# Patient Record
Sex: FEMALE | Race: BLACK OR AFRICAN AMERICAN | Employment: UNEMPLOYED | URBAN - METROPOLITAN AREA
[De-identification: names, ages, dates, MRNs, and addresses within clinical notes are randomized per-mention and may not be internally consistent; named-entity substitution may affect disease eponyms.]

---

## 2018-06-04 ENCOUNTER — HOSPITAL ENCOUNTER (OUTPATIENT)
Dept: PHYSICAL THERAPY | Age: 58
Discharge: HOME | End: 2018-06-04
Payer: COMMERCIAL

## 2018-06-04 PROCEDURE — 97110 THERAPEUTIC EXERCISES: CPT | Performed by: PHYSICAL THERAPIST

## 2018-06-04 PROCEDURE — 97161 PT EVAL LOW COMPLEX 20 MIN: CPT | Performed by: PHYSICAL THERAPIST

## 2018-06-04 NOTE — PROGRESS NOTES
[facility]  [address], [address] [facility]    OUTPATIENT physical Therapy      NAME: Kavitha Medina AGE: 62 y.o. GENDER: female  DATE: 6/4/2018  REFERRING PHYSICIAN: bruna Redding*    OBJECTIVE DATA SUMMARY:   Medical Diagnosis: Low back pain (M54.5)  PT Diagnosis: Other reduced mobility secondary to low back pain  Date of Onset: 2 months  Mechanism of Injury/Chief Complaint: Patient does not report injury but does repeated lifting, pushing, pulling at work  Present Symptoms: Patient presents with aching, throbbing bilateral low back pain. Patient reports that she has a herniated disc and has experienced low back pain off and on since 2000. Patient reports that she can go for a while without any low back pain. Patient reports that the pain is exacerbated by activity. She has been experiencing this current low back pain for 2 months now. Patient does not report radicular symptoms but has in the past. Patient wears a back brace at work. Functional Deficits and Limitations:   [x]     Sitting:   []    Dressing:   []    Reaching:  [x]     Standing:   []     Bathing:   [x]    Lifting:  [x]     Walking:   []     Cooking:   []    Yardwork:  []     Sleeping:   []     Cleaning:   []     Driving:  [x]     Work Tasks:  []     Recreation:  []    Other:    HISTORY:  Past Medical History: No past medical history on file. No past surgical history on file. Precautions: None  Current Medications: Lidocaine; Tylenol; Voltaren PRN   Prior Level of Function/Home Situation: Independent  Personal factors and/or comorbidities impacting plan of care: On and off low back pain since 2000 secondary to herniated disc  Social/Work History:  [employer] - 10 hour shifts; involves twisting, bending, pulling, lifting, pushing  Previous Therapy: Yes at [facility] for vertigo in 2015     SUBJECTIVE:   \"I've had back pain on and off since 2000. \"    Patients goals for therapy: to get my core stronger and have less pain    OBJECTIVE DATA SUMMARY:   EXAMINATION/PRESENTATION/DECISION MAKING:   Pain:   Location: Bilateral low back pain  Quality: aching and throbbing  Now: 4/10  Best: 0/10  Worst: 10/10  Factors that improve pain: heat    Spinal Assessment:   Lumbar Spine (AROM)  (*Measured 3rd finger from the floor)  Flexion  To floor; no pain  Extension WNL; discomfort; no change with repeated extension x 10  R side bend 15\"; discomfort  L side bend 15\"; discomfort  R rotation WNL  L rotation WNL    Joint Mobility:   Hypomobility noted, lumbar spine     Palpation:   Tender to palpation at left lumbar paraspinals and piriformis    Neurologic Assessment:   Tone: Normal   Sensation: Intact    Reflexes: NT    Special Tests:   (-) Slump and SLR    Mobility:   Transitional Movements: WNL    Gait: WNL    Balance: WNL    Functional Measure:   Trenton Sear: 9/24    TREATMENT/INTERVENTION:  Modalities (Rationale): to decrease pain and muscle guarding  MHP to low back for 10 minutes in supine at end of session; no skin irritation noted    Therapeutic Exercises:  Initial HEP provided 6/4/18: Rashmi Hamlin, SLR, PPT, SKTC, Angry cat stretch, Child's pose, [place] dead lifts; alternate UE/LE    PPT: 10 reps  SLR: 10 reps B  Bridges: 10 reps  SKTC: 5 reps B    Child's pose: 3 reps with 30 second holds  Angry cat stretch: 10 reps  Quadruped alt UE/LE: 10 reps B    [place] dead lifts with 3#: 5 reps B    Patient will benefit from core and glute strengthening as well as spinal stabilization    Manual Therapy:  None this date    Activity tolerance and post treatment pain report:   Good; 3/10    Based on the above components, the patient evaluation is determined to be of the following complexity level: LOW     Education:  [x]     Home exercise program provided. Education was provided to the patient on the following topics: Patient provided with initial HEP and educated on importance of regular performance of exercises.   Patient verbalized understanding of the topics presented. ASSESSMENT:   Sri Young is a 62 y.o. female who presents with bilateral low back pain for 2 months. Patient reports a history of low back pain since 2000 with periods of exacerbation. Patient reports that her pain is exacerbated by activity. Patient is a  at [employer] and her job requires her to stand for her 10 hour shifts and bend, lift, twist, pull, etc. Patient is aware of proper body mechanics and wears a back brace at work. Patient will benefit from core and glute strengthening and spinal stabilization. Physical therapy problems based on objective data include: pain affecting function, decrease ADL/ functional abilitiies, decrease activity tolerance, decrease flexibility/ joint mobility and decrease transfer abilities . Patient provided with initial HEP and educated on importance of regular performance of exercises. Patient will benefit from skilled intervention to address these impairments. Rehabilitation potential is considered to be Good. Factors which may influence rehabilitation potential include good motivation. Patient will benefit from physical therapy visits over 6 weeks to optimize improvement in these areas. PLAN OF CARE:   Recommendations and Planned Interventions:  []     Therapeutic Activities  [x]     Heat/Ice  [x]     Therapeutic Exercises  []     Ultrasound  []     Gait training  [x]     E-stim  []     Balance training  [x]     Home exercise program  [x]     Manual Therapy  [x]     TENS  [x]     Neuro Re-Ed  []     Edema management  [x]     Posture/Biomechanics  []     Pain management  [x]     Traction  []     Other:    Frequency/Duration:  Patient will be followed by physical therapy 2 times a week for 6 weeks to address goals. GOALS  Short term goals  Time frame: 3 weeks  1. Patient will be compliant and independent with the initial HEP as evidenced by being able to perform without cueing.   2. Patient will report a 25% improvement in symptoms. 3. Patient will have an increased tolerance for standing to allow 2 hours of the activity before symptoms start. 4. Patient will tolerate 20 minutes of clinic activities before onset of symptoms. Long term goals  Time frame: 6 weeks  1. Patient will report pain level decrease to 2/10 to allow increased ease of movement. 2. Patient will have an improved score on the [facility] Shauna outcome measure by 5 points to demonstrate an increase in functional activity tolerance. 3. Patient will be independent in final individualized HEP. 4. Patient will return to working without being limited by symptoms. 5. Patient will sleep 6-8 hours without being interrupted by pain. [x]     Patient has participated in goal setting and agrees to work toward plan of care. [x]     Patient was instructed to call if questions or concerns arise. Thank you for this referral.  Jose Anne, PT   Time Calculation: 50 mins    Patient Time in clinic:   Start Time: 1500   Stop Time: 1550    TREATMENT PLAN EFFECTIVE DATES:   6/4/2018 TO 8/6/18  I have read the above plan of care for Valorie Darling and certify the need for skilled physical therapy services.     Physician Signature: ____________________________________________________    Date: _________________________________________________________________

## 2018-06-11 ENCOUNTER — HOSPITAL ENCOUNTER (OUTPATIENT)
Dept: PHYSICAL THERAPY | Age: 58
Discharge: HOME | End: 2018-06-11
Payer: COMMERCIAL

## 2018-06-11 NOTE — PROGRESS NOTES
[facility]  [address], [address] [facility]    OUTPATIENT physical Therapy      6/11/2018:  Mariela Rico was not seen on this date for physical therapy for the following reasons:    [x]     Patient called to cancel the visit for the following reasons: schedule conflict   []     Patient missed the visit and did not call to cancel.     Sudha Jackson, PT

## 2018-06-14 ENCOUNTER — HOSPITAL ENCOUNTER (OUTPATIENT)
Dept: PHYSICAL THERAPY | Age: 58
Discharge: HOME | End: 2018-06-14
Payer: COMMERCIAL

## 2018-06-14 PROCEDURE — 97110 THERAPEUTIC EXERCISES: CPT | Performed by: PHYSICAL THERAPIST

## 2018-06-14 NOTE — PROGRESS NOTES
[facility]  [address], [address] [facility]    OUTPATIENT physical Therapy DAILY Treatment NOTe  Visit: 2    NAME: Nain Archuleta AGE: 62 y.o. GENDER: female  DATE: 6/14/2018  REFERRING PHYSICIAN: bruna Harrington*      GOALS  Short term goals  Time frame: 3 weeks  1. Patient will be compliant and independent with the initial HEP as evidenced by being able to perform without cueing. 2. Patient will report a 25% improvement in symptoms. 3. Patient will have an increased tolerance for standing to allow 2 hours of the activity before symptoms start. 4. Patient will tolerate 20 minutes of clinic activities before onset of symptoms.      Long term goals  Time frame: 6 weeks  1. Patient will report pain level decrease to 2/10 to allow increased ease of movement. 2. Patient will have an improved score on the [facility] Shauna outcome measure by 5 points to demonstrate an increase in functional activity tolerance. 3. Patient will be independent in final individualized HEP. 4. Patient will return to working without being limited by symptoms. 5. Patient will sleep 6-8 hours without being interrupted by pain. SUBJECTIVE:   \"I've been doing good. The exercises have really helped and I don't have any pain. \"    Pain In: 0/10    OBJECTIVE DATA SUMMARY:   Objective data from initial evaluation:  EXAMINATION/PRESENTATION/DECISION MAKING:   Pain:   Location: Bilateral low back pain  Quality: aching and throbbing  Now: 4/10  Best: 0/10  Worst: 10/10  Factors that improve pain: heat     Spinal Assessment:   Lumbar Spine (AROM)  (*Measured 3rd finger from the floor)  Flexion                         To floor; no pain  Extension                     WNL; discomfort; no change with repeated extension x 10  R side bend                 15\"; discomfort  L side bend                  15\"; discomfort  R rotation                     WNL  L rotation                     WNL     Joint Mobility: Hypomobility noted, lumbar spine      Palpation:   Tender to palpation at left lumbar paraspinals and piriformis     Neurologic Assessment:                         Tone: Normal                         Sensation: Intact                          Reflexes: NT     Special Tests:   (-) Slump and SLR     Mobility:                         Transitional Movements: WNL                          Gait: WNL     Balance: WNL     Functional Measure:   Isamar Blackburn: 9/24     TREATMENT/INTERVENTION:  Modalities (Rationale): to decrease pain and muscle guarding  MHP to low back for 10 minutes in supine at end of session; no skin irritation noted -held this date     Therapeutic Exercises:  Initial HEP provided 6/4/18: David Nice, SLR, PPT, SKTC, Angry cat stretch, Child's pose, [place] dead lifts; alternate UE/LE     PPT: 10 reps  SLR: 10 reps B  Bridges: 10 reps  One legged bridges: 5 reps B  SKTC: 5 reps B  Sidelying hip abduction: 10 reps B     Child's pose: 3 reps with 30 second holds  Angry cat stretch: 10 reps  Quadruped alt UE/LE: 10 reps B     [place] dead lifts with 3#: 2 sets of 5 reps B  Good mornings with bar: 3 sets of 5 reps  Kettle bell swings 5#: 2 sets of 10 reps    Side steps with blue TB: 10 sides x 2 B  Squats with blue TB: 10 reps     Manual Therapy:  None this date    Activity tolerance and post treatment pain report:   Good  Pain Out: 0/10    Education:  Education was provided to the patient on the following topics. []    No changes were made to the home exercise program.  [x]    The following changes were made to the home exercise program: Added one legged bridges, kettle bell swings, good mornings, side steps with TB; squats with TB (blue TB dispensed)   Patient verbalized understanding of the topics presented. ASSESSMENT:   Patient returns following initial evaluation. Patient with regular performance of exercises and demonstrates good form. Patient tolerated additional exercises well.  Patient presents with no pain in low back for the last week. Patient has not had any pain with activity at work. Patient to come for one more visit to assess form with new exercises and to ensure pain level has not increased with any activity. Patients progression toward goals is as follows:  [x]     Improving appropriately and progressing toward goals  []     Improving slowly and progressing toward goals  []     Not making progress toward goals and plan of care will be adjusted    PLAN OF CARE:   Patient continues to benefit from skilled intervention to address the above impairments. [x]    Continue treatment per established plan of care.   []     Recommend the following changes to the plan of care    Recommendations/Intent for next treatment: discharge next session    Annalise Spence, PT   Time Calculation: 30 mins  Patient Time in clinic:   Start Time: 1430   Stop Time: 1500

## 2018-06-21 ENCOUNTER — HOSPITAL ENCOUNTER (OUTPATIENT)
Dept: PHYSICAL THERAPY | Age: 58
Discharge: HOME | End: 2018-06-21
Payer: COMMERCIAL

## 2018-06-21 PROCEDURE — 97110 THERAPEUTIC EXERCISES: CPT | Performed by: PHYSICAL THERAPIST

## 2018-06-21 NOTE — PROGRESS NOTES
[facility]  [address], [address] [facility]    OUTPATIENT physical Therapy DAILY Treatment NOTe WITH DISCHARGE  Visit: 3    NAME: Arielle Green AGE: 62 y.o. GENDER: female  DATE: 6/21/2018  REFERRING PHYSICIAN: bruna Mandel*      GOALS  Short term goals  Time frame: 3 weeks  1. Patient will be compliant and independent with the initial HEP as evidenced by being able to perform without cueing. MET  2. Patient will report a 25% improvement in symptoms. MET  3. Patient will have an increased tolerance for standing to allow 2 hours of the activity before symptoms start. MET  4. Patient will tolerate 20 minutes of clinic activities before onset of symptoms. MET     Long term goals  Time frame: 6 weeks  1. Patient will report pain level decrease to 2/10 to allow increased ease of movement. MET  2. Patient will have an improved score on the [facility] Shauna outcome measure by 5 points to demonstrate an increase in functional activity tolerance. MET  3. Patient will be independent in final individualized HEP. MET  4. Patient will return to working without being limited by symptoms. MET   5. Patient will sleep 6-8 hours without being interrupted by pain. MET       SUBJECTIVE:   \"I've been feeling pretty good. \"    Pain In: 0/10    OBJECTIVE DATA SUMMARY:     EXAMINATION/PRESENTATION/DECISION MAKING:   Pain:   Location: Bilateral low back pain  Quality: aching and throbbing  Now: 0/10  Best: 0/10  Worst: 3/10  Factors that improve pain: heat; exercises     Spinal Assessment:   Lumbar Spine (AROM)  (*Measured 3rd finger from the floor)  Flexion                         To floor; no pain  Extension                     WNL; discomfort  R side bend                 15\"  L side bend                  15\"   R rotation                     WNL  L rotation                     WNL     Joint Mobility:   Hypomobility noted, lumbar spine      Palpation:   No tenderness this date     Neurologic Assessment:                         Tone: Normal                         Sensation: Intact                          Reflexes: NT     Special Tests:   (-) Slump and SLR     Mobility:                         Transitional Movements: WNL                          Gait: WNL     Balance: WNL     Functional Measure:   Berton Shaper: 9/24 on evaluation  Berton Shaper: 4/24 on discharge     TREATMENT/INTERVENTION:  Modalities (Rationale): None this date     Therapeutic Exercises:  Initial HEP provided 6/4/18: Teresa Ruano, SLR, PPT, SKTC, Angry cat stretch, Child's pose, [place] dead lifts; alternate UE/LE  Added to HEP: Side steps with TB; squats with TB; sidelying hip abduction; quadruped hip extension; TA holds in standing     PPT: 10 reps  SLR: 10 reps B  Bridges: 10 reps  One legged bridges: 5 reps B  SKTC: 5 reps B  Sidelying hip abduction: 10 reps B     Child's pose: 3 reps with 30 second holds  Angry cat stretch: 10 reps  Quadruped alt UE/LE: 10 reps B   Quadruped hip extension: 10 reps B     [place] dead lifts with 3#: 2 sets of 5 reps B  Kettle bell swings 3#: 2 sets of 10 reps    Side steps with blue TB: 10 sides   Squats with blue TB: 10 reps  Standing TA holds: 5 second hold with 5# weight to simulate work activities     Activity tolerance and post treatment pain report:   Good  Pain Out: 0/10    Education:  Education was provided to the patient on the following topics. []    No changes were made to the home exercise program.  [x]    The following changes were made to the home exercise program: Added to HEP ( see above); educated to continue exercises 3 times per week; educated on importance of maintaining TA hold during lifting at work and at home in order to decrease load placed to low back and minimize risk of injury  Patient verbalized understanding of the topics presented. ASSESSMENT:   Patient discharged from physical therapy after 3 visits from 6/4/18 to 6/21/18.  Patient reports no back pain with regular activities. Patient with minor discomfort at work one day earlier this week. Educated extensively on proper body mechanics and maintaining TA hold with lifting at work; patient demonstrated good understanding of topics. Patient with regular performance of exercises and demonstrates good form. Patient provided with comprehensive HEP to continue 3 times per week. Patient with significant improvement on Frandy Joaquin questionnaire from 9/24 on evaluation to 4/24 on discharge. PLAN OF CARE:   Patient will be discharged from physical therapy at this time.   Criteria for termination of care:  [x]   Goals Achieved  []   [address]  []   Patient has not returned  []   Other  Plan for follow up, continuing care, or equipment recommendations: Patient educated to continue stretches and strengthening exercises at least 3 times per week   Thank you for this referral.    Omar Wyatt, PT   Time Calculation: 20 mins  Patient Time in clinic:   Start Time: 1300   Stop Time: [phone]

## 2019-09-27 ENCOUNTER — HOSPITAL ENCOUNTER (OUTPATIENT)
Dept: GENERAL RADIOLOGY | Age: 59
Discharge: HOME | End: 2019-09-27
Payer: COMMERCIAL

## 2019-09-27 DIAGNOSIS — M54.2 NECK PAIN: ICD-10-CM

## 2019-09-27 PROCEDURE — 72050 X-RAY EXAM NECK SPINE 4/5VWS: CPT

## 2020-07-30 NOTE — PROGRESS NOTES
Number on file is not a working number, called Dr. Marquez Banker office and was given: [phone] (mobile) and [phone] (other).

## 2020-07-31 ENCOUNTER — HOSPITAL ENCOUNTER (OUTPATIENT)
Dept: PREADMISSION TESTING | Age: 60
Discharge: HOME | End: 2020-07-31
Payer: COMMERCIAL

## 2020-07-31 PROCEDURE — 87635 SARS-COV-2 COVID-19 AMP PRB: CPT

## 2020-08-01 LAB
SARS-COV-2, COV2NT: NOT DETECTED
SOURCE, COVRS: NORMAL
SPECIMEN SOURCE, FCOV2M: NORMAL

## 2020-08-04 ENCOUNTER — HOSPITAL ENCOUNTER (OUTPATIENT)
Age: 60
Discharge: HOME | End: 2020-08-04
Attending: INTERNAL MEDICINE | Admitting: INTERNAL MEDICINE
Payer: COMMERCIAL

## 2020-08-04 VITALS
RESPIRATION RATE: 20 BRPM | BODY MASS INDEX: 26.99 KG/M2 | SYSTOLIC BLOOD PRESSURE: 126 MMHG | TEMPERATURE: 97.8 F | HEART RATE: 60 BPM | OXYGEN SATURATION: 98 % | WEIGHT: 162 LBS | DIASTOLIC BLOOD PRESSURE: 75 MMHG | HEIGHT: 65 IN

## 2020-08-04 DIAGNOSIS — R07.9 CHEST PAIN, UNSPECIFIED TYPE: ICD-10-CM

## 2020-08-04 PROCEDURE — 77030004549 HC CATH ANGI DX PRF MRTM -A: Performed by: INTERNAL MEDICINE

## 2020-08-04 PROCEDURE — 77030008543 HC TBNG MON PRSS MRTM -A: Performed by: INTERNAL MEDICINE

## 2020-08-04 PROCEDURE — 74011636320 HC RX REV CODE- 636/320: Performed by: INTERNAL MEDICINE

## 2020-08-04 PROCEDURE — 93458 L HRT ARTERY/VENTRICLE ANGIO: CPT | Performed by: INTERNAL MEDICINE

## 2020-08-04 PROCEDURE — 77030019569 HC BND COMPR RAD TERU -B: Performed by: INTERNAL MEDICINE

## 2020-08-04 PROCEDURE — 99152 MOD SED SAME PHYS/QHP 5/>YRS: CPT | Performed by: INTERNAL MEDICINE

## 2020-08-04 PROCEDURE — C1894 INTRO/SHEATH, NON-LASER: HCPCS | Performed by: INTERNAL MEDICINE

## 2020-08-04 PROCEDURE — 74011250636 HC RX REV CODE- 250/636: Performed by: INTERNAL MEDICINE

## 2020-08-04 PROCEDURE — 77030010221 HC SPLNT WR POS TELE -B: Performed by: INTERNAL MEDICINE

## 2020-08-04 PROCEDURE — 77030030195 HC CATH ANGI DX PRF4 MRTM -A: Performed by: INTERNAL MEDICINE

## 2020-08-04 PROCEDURE — 74011000250 HC RX REV CODE- 250: Performed by: INTERNAL MEDICINE

## 2020-08-04 RX ORDER — GUAIFENESIN 100 MG/5ML
81 LIQUID (ML) ORAL DAILY
COMMUNITY

## 2020-08-04 RX ORDER — LIDOCAINE HYDROCHLORIDE 10 MG/ML
INJECTION, SOLUTION EPIDURAL; INFILTRATION; INTRACAUDAL; PERINEURAL AS NEEDED
Status: DISCONTINUED | OUTPATIENT
Start: 2020-08-04 | End: 2020-08-04 | Stop reason: HOSPADM

## 2020-08-04 RX ORDER — SODIUM CHLORIDE 0.9 % (FLUSH) 0.9 %
5-40 SYRINGE (ML) INJECTION EVERY 8 HOURS
Status: DISCONTINUED | OUTPATIENT
Start: 2020-08-04 | End: 2020-08-04 | Stop reason: HOSPADM

## 2020-08-04 RX ORDER — BUPROPION HYDROCHLORIDE 150 MG/1
150 TABLET, EXTENDED RELEASE ORAL
COMMUNITY

## 2020-08-04 RX ORDER — HEPARIN SODIUM 200 [USP'U]/100ML
INJECTION, SOLUTION INTRAVENOUS
Status: COMPLETED | OUTPATIENT
Start: 2020-08-04 | End: 2020-08-04

## 2020-08-04 RX ORDER — VERAPAMIL HYDROCHLORIDE 2.5 MG/ML
INJECTION, SOLUTION INTRAVENOUS AS NEEDED
Status: DISCONTINUED | OUTPATIENT
Start: 2020-08-04 | End: 2020-08-04 | Stop reason: HOSPADM

## 2020-08-04 RX ORDER — LAMOTRIGINE 200 MG/1
200 TABLET ORAL DAILY
COMMUNITY

## 2020-08-04 RX ORDER — LOSARTAN POTASSIUM AND HYDROCHLOROTHIAZIDE 25; 100 MG/1; MG/1
0.5 TABLET ORAL DAILY
COMMUNITY

## 2020-08-04 RX ORDER — FENTANYL CITRATE 50 UG/ML
INJECTION, SOLUTION INTRAMUSCULAR; INTRAVENOUS AS NEEDED
Status: DISCONTINUED | OUTPATIENT
Start: 2020-08-04 | End: 2020-08-04 | Stop reason: HOSPADM

## 2020-08-04 RX ORDER — MIDAZOLAM HYDROCHLORIDE 1 MG/ML
INJECTION, SOLUTION INTRAMUSCULAR; INTRAVENOUS AS NEEDED
Status: DISCONTINUED | OUTPATIENT
Start: 2020-08-04 | End: 2020-08-04 | Stop reason: HOSPADM

## 2020-08-04 RX ORDER — PROCHLORPERAZINE MALEATE 5 MG
5 TABLET ORAL
COMMUNITY

## 2020-08-04 RX ORDER — NITROGLYCERIN 0.4 MG/1
0.4 TABLET SUBLINGUAL
COMMUNITY

## 2020-08-04 RX ORDER — OXYBUTYNIN CHLORIDE 10 MG/1
10 TABLET, EXTENDED RELEASE ORAL DAILY
COMMUNITY

## 2020-08-04 RX ORDER — SODIUM CHLORIDE 0.9 % (FLUSH) 0.9 %
5-40 SYRINGE (ML) INJECTION AS NEEDED
Status: DISCONTINUED | OUTPATIENT
Start: 2020-08-04 | End: 2020-08-04 | Stop reason: HOSPADM

## 2020-08-04 RX ORDER — HEPARIN SODIUM 1000 [USP'U]/ML
INJECTION, SOLUTION INTRAVENOUS; SUBCUTANEOUS AS NEEDED
Status: DISCONTINUED | OUTPATIENT
Start: 2020-08-04 | End: 2020-08-04 | Stop reason: HOSPADM

## 2020-08-04 RX ORDER — AMLODIPINE BESYLATE 5 MG/1
5 TABLET ORAL DAILY
COMMUNITY

## 2020-08-04 RX ORDER — QUETIAPINE FUMARATE 100 MG/1
100 TABLET, FILM COATED ORAL DAILY
COMMUNITY

## 2020-08-04 RX ORDER — METOPROLOL SUCCINATE 25 MG/1
25 TABLET, EXTENDED RELEASE ORAL DAILY
COMMUNITY

## 2020-08-04 RX ADMIN — Medication 10 ML: at 17:45

## 2020-08-04 NOTE — DISCHARGE INSTRUCTIONS
[address] [facility], [address]   [phone]  [address]    [email or web address]    Patient Discharge Instructions    Tong Ruff / [id number] : 1960    Admitted 2020 Discharged: 2020       · It is important that you take the medication exactly as they are prescribed. · Keep your medication in the bottles provided by the pharmacist and keep a list of the medication names, dosages, and times to be taken in your wallet. · Do not take other medications without consulting your doctor. BRING ALL OF YOUR MEDICINES TO YOUR OFFICE VISIT with my nurse practitioner, Alvaro Lobo. .    Follow-up with my nurse practitioner, Alvaro Lobo in 2 weeks. Cardiac Catheterization  Discharge Instructions    Transradial Catheterization Discharge Instructions (WRIST)    Discharge instructions: Your radial artery in your wrist was used for your cardiac catheterization. This site may be slightly bruised and sore following your procedure. Expect mild tingling or the hand and tenderness at the puncture site for up to 3 days. Excess movement of the wrist used should be avoided for the next 24-48 hours. 1. No lifting over 2 pounds (approximately a ½ gallon of milk) with this arm for 24 hours. 2. Keep the site of the procedure covered with a bandage for 24 hours. 3. You may shower the day after your procedure. Do not take a tub bath or submerge the puncture site in water for 48 hours. 4. No heavy impact activity/lifting > 30 pounds for 1 week. If bleeding of the wrist occurs at home:   If the site on your wrist where you had the catheterization procedure begins to bleed, do not panic. 1. Place 1 or 2 fingers over the puncture site and hold pressure to stop the bleeding. You may be able to feel your pulse as you hold pressure. 2. Lift your fingers after 5 minutes to see if the bleeding has stopped.    3. Once the bleeding has stopped, gently wipe the wrist area clean and cover with a bandage. If the bleeding from your wrist does not stop after 15 minutes, or if there is a large amount of bleeding or spurting, call 911 immediately (do not drive yourself to the hospital). Other concerns: The site may be slightly bruised and sore following your procedure. Should any of the following occur, contact your physician immediately:   1. Any cool or coldness of the arm, discoloration over a large area, ongoing numbness or any abnormal sensations , moderate to severe pain or swelling in the arm. 2. Redness, soreness, swelling, chills or fever, or colored drainage at the procedure site within 3-7 days after your procedure. If you have any further questions or concerns regarding your procedure please call the Cardiac Cath Lab office at [phone]. During regular business hours ask to speak to Dr. Moe Burks. During non-business hours the answering service will answer. Ask to speak to the physician on call for [facility].     Transfemoral Catheterization Discharge Instructions Ismael Johnston)     Do not drive, operate any machinery, or sign any legal documents for 24 hours after your procedure. You must have someone to drive you home.  You may take a shower 24 hours after your cardiac catheterization. Be sure to get the dressing wet and then remove it; gently wash the area with warm soapy water. Pat dry and leave open to air. To help prevent infections, be sure to keep the cath site clean and dry. No lotions, creams, powders, ointments, etc. in the cath site for approximately 1 week.  Do not take a tub bath, get in a hot tub or swimming pool for approximately 5 days or until the cath site is completely healed.  No strenuous activity or heavy lifting over 10 lbs. for 7 days.  Drink plenty of fluids for 24-48 hours after your cath to flush the contrast dye from your kidneys. No alcoholic beverages for 24 hours.   You may resume your previous diet (low fat, low cholesterol) after your cath.  After your cath, some bruising or discomfort is common during the healing process. Tylenol, 1-2 tablets every 6 hours as needed, is recommended if you experience any discomfort. If you experience any signs or symptoms of infection such as fever, chills, or poorly healing incision, persistent tenderness or swelling in the groin, redness and/or warmth to the touch, numbness, significant tingling or pain at the groin site or affected extremity, rash, drainage from the cath site, or if the leg feels tight or swollen, call your physician right away.  If bleeding at the cath site occurs, take a clean gauze pad and apply direct pressure to the groin just above the puncture site. Call 911 immediately, and continue to apply direct pressure until an ambulance gets to your location.  You may return to work  2  days after your cardiac cath if no groin bleeding. Information obtained by :  I understand that if any problems occur once I am at home I am to contact my physician. I understand and acknowledge receipt of the instructions indicated above. R.N.'s Signature                                                                  Date/Time                                                                                                                                              Patient or Representative Signature                                                          Date/Time      Hilda Torres III, DO             [address]    [phone]  [place], [address] [place]    [email or web address]

## 2020-08-04 NOTE — PROGRESS NOTES
Problem: Falls - Risk of  Goal: *Absence of Falls  Description: Document Tre Hathaway Fall Risk and appropriate interventions in the flowsheet.   Outcome: Resolved/Met     Problem: Patient Education: Go to Patient Education Activity  Goal: Patient/Family Education  Outcome: Resolved/Met     Problem: Patient Education: Go to Patient Education Activity  Goal: Patient/Family Education  Outcome: Resolved/Met     Problem: Cath Lab Procedures: Pre-Procedure  Goal: Off Pathway (Use only if patient is Off Pathway)  Outcome: Resolved/Met  Goal: Activity/Safety  Outcome: Resolved/Met  Goal: Consults, if ordered  Outcome: Resolved/Met  Goal: Diagnostic Test/Procedures  Outcome: Resolved/Met  Goal: Nutrition/Diet  Outcome: Resolved/Met  Goal: Discharge Planning  Outcome: Resolved/Met  Goal: Medications  Outcome: Resolved/Met  Goal: Respiratory  Outcome: Resolved/Met  Goal: Treatments/Interventions/Procedures  Outcome: Resolved/Met  Goal: Psychosocial  Outcome: Resolved/Met  Goal: *Verbalize description of procedure  Outcome: Resolved/Met  Goal: *Consent signed  Outcome: Resolved/Met     Problem: Cath Lab Procedures: Post-Cath Day of Procedure (Initiate SCIP Measures for Post-Op Care)  Goal: Off Pathway (Use only if patient is Off Pathway)  Outcome: Resolved/Met  Goal: Activity/Safety  Outcome: Resolved/Met  Goal: Consults, if ordered  Outcome: Resolved/Met  Goal: Diagnostic Test/Procedures  Outcome: Resolved/Met  Goal: Nutrition/Diet  Outcome: Resolved/Met  Goal: Discharge Planning  Outcome: Resolved/Met  Goal: Medications  Outcome: Resolved/Met  Goal: Respiratory  Outcome: Resolved/Met  Goal: Treatments/Interventions/Procedures  Outcome: Resolved/Met  Goal: Psychosocial  Outcome: Resolved/Met  Goal: *Procedure site is without bleeding and signs of infection six hours post sheath removal  Outcome: Resolved/Met  Goal: *Hemodynamically stable  Outcome: Resolved/Met  Goal: *Optimal pain control at patient's stated goal  Outcome: Resolved/Met     Problem: Cath Lab Procedures: Post-Cath Day 1  Goal: Off Pathway (Use only if patient is Off Pathway)  Outcome: Resolved/Met  Goal: Activity/Safety  Outcome: Resolved/Met  Goal: Diagnostic Test/Procedures  Outcome: Resolved/Met  Goal: Nutrition/Diet  Outcome: Resolved/Met  Goal: Discharge Planning  Outcome: Resolved/Met  Goal: Medications  Outcome: Resolved/Met  Goal: Respiratory  Outcome: Resolved/Met  Goal: Treatments/Interventions/Procedures  Outcome: Resolved/Met  Goal: Psychosocial  Outcome: Resolved/Met     Problem: Cath Lab Procedures: Discharge Outcomes  Goal: *Stable cardiac rhythm  Outcome: Resolved/Met  Goal: *Hemodynamically stable  Outcome: Resolved/Met  Goal: *Optimal pain control at patient's stated goal  Outcome: Resolved/Met  Goal: *Pulses palpable, skin color within defined limits, skin temperature warm  Outcome: Resolved/Met  Goal: *Lungs clear or at baseline  Outcome: Resolved/Met  Goal: *Demonstrates ability to perform prescribed activity without shortness of breath or discomfort  Outcome: Resolved/Met  Goal: *Verbalizes home exercise program, activity guidelines, cardiac precautions  Outcome: Resolved/Met  Goal: *Verbalizes understanding and describes prescribed diet  Outcome: Resolved/Met  Goal: *Verbalizes understanding and describes medication purposes and frequencies  Outcome: Resolved/Met  Goal: *Identifies cardiac risk factors  Outcome: Resolved/Met  Goal: *No signs and symptoms of infection or wound complications  Outcome: Resolved/Met  Goal: *Anxiety reduced or absent  Outcome: Resolved/Met  Goal: *Verbalizes and demonstrates incision care  Outcome: Resolved/Met  Goal: *Understands and describes signs and symptoms to report to providers(Stroke Metric)  Outcome: Resolved/Met  Goal: *Describes follow-up/return visits to physicians  Outcome: Resolved/Met  Goal: *Describes available resources and support systems  Outcome: Resolved/Met  Goal: *Influenza immunization  Outcome: Resolved/Met  Goal: *Pneumococcal immunization  Outcome: Resolved/Met

## 2020-08-04 NOTE — PROGRESS NOTES
3:08 PM Patient arrived to room 2163 via bed from [facility]. TR band present to R radial cath site is now patent at 10 cc d/t patient c/o tingling in her fingers. Site is c/d/i no bleeding and no hematoma. VSS. Assessment complete. Will continue to monitor. 4:40 PM TR band taken down by RN, Ching Henry, without complications. New dressing applied is c/d/i no bleeding and no hematoma. VSS. Will continue to monitor. 6:45 PM Patient ambulated to bathroom and voided clear, yellow urine without difficulty. Patient ambulated in hallway with RN x1 and tolerated well. Patient specifically denies c/o pain, shortness of breath and/or dizziness. R radial cath site remains c/d/i no bleeding and no hematoma post ambulation. 7:00 PM PIV removed, tip intact. Discharge instructions reviewed with patient including, but not limited to, medications, follow up appointments and post cath site care. Patient given opportunity for questions and verbalized understanding of all instructions. Patient has all belongings and instructions on discharge. Patient's R radial cath site remains c/d/i no bleeding and no hematoma on discharge. Patient escorted to private vehicle via wheelchair. Patient's family to drive her home.

## 2020-08-04 NOTE — PROGRESS NOTES
Brief Procedure Note    Patient: Dev Pepper MRN: [id number]  SSN: [id number]    YOB: 1960  Age: 61 y.o. Sex: female      Date of Procedure: 8/4/2020     Pre-procedure Diagnosis: CP    Post-procedure Diagnosis: No angiographic epicardial coronary disease, nml LVEF, elevated LVEDP    Procedure: LHc, cors, LVg    Performed By: Kasey Nguyen III, DO     Anesthesia: Moderate Sedation    Estimated Blood Loss: Less than 10 mL      Specimens:  None    Findings: as above    Complications: None    Implants: None    Recommendations: Continue medical therapy.     Signed By: Kasey Nguyen III, DO     August 4, 2020

## 2020-08-04 NOTE — Clinical Note
TRANSFER - OUT REPORT:     Verbal report given to: Dorys ELIZALDE. Report consisted of patient's Situation, Background, Assessment and   Recommendations(SBAR). Opportunity for questions and clarification was provided. Patient transported with a Registered Nurse. Patient transported to: [facility].

## 2022-02-18 ENCOUNTER — HOSPITAL ENCOUNTER (OUTPATIENT)
Dept: GENERAL RADIOLOGY | Age: 62
Discharge: HOME | End: 2022-02-18
Payer: COMMERCIAL

## 2022-02-18 ENCOUNTER — TRANSCRIBE ORDER (OUTPATIENT)
Dept: REGISTRATION | Age: 62
End: 2022-02-18

## 2022-02-18 DIAGNOSIS — M25.512 PAIN IN LEFT SHOULDER: ICD-10-CM

## 2022-02-18 DIAGNOSIS — M25.512 PAIN IN LEFT SHOULDER: Primary | ICD-10-CM

## 2022-02-18 PROCEDURE — 73030 X-RAY EXAM OF SHOULDER: CPT

## 2023-05-11 RX ORDER — NITROGLYCERIN 0.4 MG/1
TABLET SUBLINGUAL
COMMUNITY

## 2023-05-11 RX ORDER — QUETIAPINE FUMARATE 100 MG/1
100 TABLET, FILM COATED ORAL DAILY
COMMUNITY

## 2023-05-11 RX ORDER — PROCHLORPERAZINE MALEATE 5 MG/1
TABLET ORAL EVERY 6 HOURS PRN
COMMUNITY

## 2023-05-11 RX ORDER — OXYBUTYNIN CHLORIDE 10 MG/1
10 TABLET, EXTENDED RELEASE ORAL DAILY
COMMUNITY

## 2023-05-11 RX ORDER — LAMOTRIGINE 200 MG/1
200 TABLET ORAL DAILY
COMMUNITY

## 2023-05-11 RX ORDER — METOPROLOL SUCCINATE 25 MG/1
TABLET, EXTENDED RELEASE ORAL DAILY
COMMUNITY

## 2023-05-11 RX ORDER — BUPROPION HYDROCHLORIDE 150 MG/1
TABLET, EXTENDED RELEASE ORAL
COMMUNITY

## 2023-05-11 RX ORDER — LOSARTAN POTASSIUM AND HYDROCHLOROTHIAZIDE 25; 100 MG/1; MG/1
0.5 TABLET ORAL DAILY
COMMUNITY

## 2023-05-11 RX ORDER — ASPIRIN 81 MG/1
81 TABLET, CHEWABLE ORAL DAILY
COMMUNITY

## 2023-05-11 RX ORDER — AMLODIPINE BESYLATE 5 MG/1
5 TABLET ORAL DAILY
COMMUNITY

## 2023-07-24 ENCOUNTER — HOSPITAL ENCOUNTER (EMERGENCY)
Facility: HOSPITAL | Age: 63
Discharge: HOME | End: 2023-07-24
Payer: COMMERCIAL

## 2023-07-24 ENCOUNTER — APPOINTMENT (OUTPATIENT)
Facility: HOSPITAL | Age: 63
End: 2023-07-24
Payer: COMMERCIAL

## 2023-07-24 VITALS
BODY MASS INDEX: 27.66 KG/M2 | TEMPERATURE: 97.5 F | OXYGEN SATURATION: 98 % | WEIGHT: 166 LBS | SYSTOLIC BLOOD PRESSURE: 142 MMHG | DIASTOLIC BLOOD PRESSURE: 69 MMHG | HEIGHT: 65 IN | HEART RATE: 71 BPM | RESPIRATION RATE: 16 BRPM

## 2023-07-24 DIAGNOSIS — S20.211A CONTUSION OF RIB ON RIGHT SIDE, INITIAL ENCOUNTER: Primary | ICD-10-CM

## 2023-07-24 PROCEDURE — 71101 X-RAY EXAM UNILAT RIBS/CHEST: CPT

## 2023-07-24 PROCEDURE — 99283 EMERGENCY DEPT VISIT LOW MDM: CPT

## 2023-07-24 RX ORDER — NAPROXEN 500 MG/1
500 TABLET ORAL 2 TIMES DAILY PRN
Qty: 30 TABLET | Refills: 0 | Status: SHIPPED | OUTPATIENT
Start: 2023-07-24

## 2023-07-24 RX ORDER — LIDOCAINE 4 G/G
1 PATCH TOPICAL DAILY
Qty: 30 PATCH | Refills: 0 | Status: SHIPPED | OUTPATIENT
Start: 2023-07-24 | End: 2023-08-23

## 2023-07-24 RX ORDER — HYDROCODONE BITARTRATE AND ACETAMINOPHEN 5; 325 MG/1; MG/1
1 TABLET ORAL EVERY 6 HOURS PRN
Qty: 10 TABLET | Refills: 0 | Status: SHIPPED | OUTPATIENT
Start: 2023-07-24 | End: 2023-07-27

## 2023-07-24 ASSESSMENT — PAIN DESCRIPTION - ORIENTATION: ORIENTATION: RIGHT

## 2023-07-24 ASSESSMENT — PAIN SCALES - GENERAL: PAINLEVEL_OUTOF10: 8

## 2023-07-24 ASSESSMENT — PAIN DESCRIPTION - LOCATION: LOCATION: RIB CAGE

## 2023-07-24 ASSESSMENT — PAIN DESCRIPTION - PAIN TYPE: TYPE: ACUTE PAIN

## 2023-07-24 ASSESSMENT — PAIN DESCRIPTION - FREQUENCY: FREQUENCY: CONTINUOUS

## 2023-07-24 ASSESSMENT — PAIN - FUNCTIONAL ASSESSMENT: PAIN_FUNCTIONAL_ASSESSMENT: 0-10

## 2023-07-24 NOTE — ED PROVIDER NOTES
16 days ago. She was seen at urgent care 2 days later and had negative x-rays. Pain has persisted and is severe, making it hard to sleep and find a position of comfort. Differential diagnosis includes contusion, fracture, muscle spasm, pneumonia, pneumothorax. Patient is afebrile with stable vital signs. She is maintaining high oxygenation on room air and lungs are clear to auscultation bilaterally. She has tenderness patient is afebrile with stable vital signs. She is maintaining high oxygenation on room air and lungs are clear to auscultation bilaterally. She has tenderness over the right anterior mid to lower chest wall without visible bruising or deformity. Will plan for x-ray. 2:40 PM - X-ray shows no evidence of fracture, pneumothorax, or pneumonia. Discussed symptomatic treatment. Recommended follow-up with PCP for recheck and discussed return precautions. Disposition Considerations (Tests not done, Shared Decision Making, Pt Expectation of Test or Tx.):      FINAL IMPRESSION     1. Contusion of rib on right side, initial encounter          DISPOSITION/PLAN   DISPOSITION Decision To Discharge 07/24/2023 02:40:49 PM      Discharge Note: The patient is stable for discharge home. The signs, symptoms, diagnosis, and discharge instructions have been discussed, understanding conveyed, and agreed upon. The patient is to follow up as recommended or return to ER should their symptoms worsen.       PATIENT REFERRED TO:  Ying Iniguez, [facility]  [address] [place] [address]  [phone]    Schedule an appointment as soon as possible for a visit in 1 week      [facility] EMERGENCY DEPT  [address] [place] [address]  [phone]  Go to   If symptoms worsen       DISCHARGE MEDICATIONS:     Medication List        START taking these medications      HYDROcodone-acetaminophen 5-325 MG per tablet  Commonly known as: Norco  Take 1 tablet by mouth every 6 hours as needed for Pain for up

## 2023-07-24 NOTE — ED NOTES
Patient given copy of dc instructions and 0 paper script(s) and 3 electronic scripts. Patient verbalized understanding of instructions and script (s). Patient given a current medication reconciliation form and verbalized understanding of their medications. Patient verbalized understanding of the importance of discussing medications with  his or her physician or clinic they will be following up with. Patient alert and oriented and in no acute distress. Lefty Tipton.  [facility]ianoLifePoint Hospitals[address]  07/24/23 6103

## 2023-07-24 NOTE — ED TRIAGE NOTES
PT reports fall 2 weeks ago. [facility]. Was told had contusions to ribs. pt has been using tylenol. Motrin, & muscle relaxer's. Pt denies improvement.  Wants to be checked again for fractures,

## (undated) DEVICE — KIT ACCS INTRO 4FR L10CM NDL 21GA L7CM GWIRE L40CM

## (undated) DEVICE — PACK PROCEDURE SURG HRT CATH

## (undated) DEVICE — TR BAND RADIAL ARTERY COMPRESSION DEVICE: Brand: TR BAND

## (undated) DEVICE — ANGIOGRAPHY KIT CUST [K0910930B] [MERIT MEDICAL SYSTEMS INC]

## (undated) DEVICE — CATHETER ETER DIAG L110CM OD5FR VASC PGTL COR AD PERFORMA

## (undated) DEVICE — 3M™ TEGADERM™ TRANSPARENT FILM DRESSING FRAME STYLE, 1626W, 4 IN X 4-3/4 IN (10 CM X 12 CM), 50/CT 4CT/CASE: Brand: 3M™ TEGADERM™

## (undated) DEVICE — CATHETER ANGIO JR4 AD 5 FRX100 CM 25 CM PERFORMA

## (undated) DEVICE — GLIDESHEATH SLENDER ACCESS KIT: Brand: GLIDESHEATH SLENDER

## (undated) DEVICE — SPLINT WR POS F/ARTERIAL ACC -- BX/10

## (undated) DEVICE — CATHETER ANGIO JL3.5 AD 5 FRX100 CM PERFORMA

## (undated) DEVICE — TUBING PRSS MON L6IN PVC M FEM CONN